# Patient Record
Sex: MALE | Race: WHITE | NOT HISPANIC OR LATINO | Employment: UNEMPLOYED | URBAN - METROPOLITAN AREA
[De-identification: names, ages, dates, MRNs, and addresses within clinical notes are randomized per-mention and may not be internally consistent; named-entity substitution may affect disease eponyms.]

---

## 2017-01-23 RX ORDER — TEMAZEPAM 15 MG/1
CAPSULE ORAL
Qty: 30 CAP | Refills: 0 | Status: SHIPPED
Start: 2017-01-23 | End: 2017-02-19 | Stop reason: SDUPTHER

## 2017-01-23 NOTE — TELEPHONE ENCOUNTER
scanned into media.    Was the patient seen in the last year in this department? Yes     Does patient have an active prescription for medications requested? No     Received Request Via: Pharmacy

## 2017-02-22 RX ORDER — TEMAZEPAM 15 MG/1
CAPSULE ORAL
Qty: 90 CAP | Refills: 1 | Status: SHIPPED | OUTPATIENT
Start: 2017-02-22

## 2017-03-02 ENCOUNTER — HOSPITAL ENCOUNTER (OUTPATIENT)
Facility: MEDICAL CENTER | Age: 52
End: 2017-03-02
Attending: ORTHOPAEDIC SURGERY | Admitting: ORTHOPAEDIC SURGERY
Payer: MEDICAID

## 2017-03-03 ENCOUNTER — HOSPITAL ENCOUNTER (OUTPATIENT)
Dept: RADIOLOGY | Facility: MEDICAL CENTER | Age: 52
End: 2017-03-03
Attending: ORTHOPAEDIC SURGERY | Admitting: ORTHOPAEDIC SURGERY
Payer: MEDICAID

## 2017-03-03 VITALS — BODY MASS INDEX: 29.66 KG/M2 | HEIGHT: 71 IN | WEIGHT: 211.86 LBS

## 2017-03-03 DIAGNOSIS — Z01.810 PRE-OPERATIVE CARDIOVASCULAR EXAMINATION: ICD-10-CM

## 2017-03-03 DIAGNOSIS — Z01.811 PRE-OPERATIVE RESPIRATORY EXAMINATION: ICD-10-CM

## 2017-03-03 DIAGNOSIS — Z01.812 PRE-PROCEDURAL LABORATORY EXAMINATION: ICD-10-CM

## 2017-03-03 LAB
ALBUMIN SERPL BCP-MCNC: 4.3 G/DL (ref 3.2–4.9)
ALBUMIN/GLOB SERPL: 1.3 G/DL
ALP SERPL-CCNC: 89 U/L (ref 30–99)
ALT SERPL-CCNC: 46 U/L (ref 2–50)
ANION GAP SERPL CALC-SCNC: 13 MMOL/L (ref 0–11.9)
APPEARANCE UR: ABNORMAL
APTT PPP: 26.8 SEC (ref 24.7–36)
AST SERPL-CCNC: 28 U/L (ref 12–45)
BASOPHILS # BLD AUTO: 0.6 % (ref 0–1.8)
BASOPHILS # BLD: 0.07 K/UL (ref 0–0.12)
BILIRUB SERPL-MCNC: 0.4 MG/DL (ref 0.1–1.5)
BILIRUB UR QL STRIP.AUTO: NEGATIVE
BUN SERPL-MCNC: 19 MG/DL (ref 8–22)
CALCIUM SERPL-MCNC: 9.6 MG/DL (ref 8.5–10.5)
CHLORIDE SERPL-SCNC: 102 MMOL/L (ref 96–112)
CO2 SERPL-SCNC: 19 MMOL/L (ref 20–33)
COLOR UR: ABNORMAL
CREAT SERPL-MCNC: 0.93 MG/DL (ref 0.5–1.4)
CULTURE IF INDICATED INDCX: YES UA CULTURE
EKG IMPRESSION: NORMAL
EOSINOPHIL # BLD AUTO: 0.18 K/UL (ref 0–0.51)
EOSINOPHIL NFR BLD: 1.4 % (ref 0–6.9)
ERYTHROCYTE [DISTWIDTH] IN BLOOD BY AUTOMATED COUNT: 49.3 FL (ref 35.9–50)
ERYTHROCYTE [SEDIMENTATION RATE] IN BLOOD BY WESTERGREN METHOD: 4 MM/HOUR (ref 0–20)
GFR SERPL CREATININE-BSD FRML MDRD: >60 ML/MIN/1.73 M 2
GLOBULIN SER CALC-MCNC: 3.4 G/DL (ref 1.9–3.5)
GLUCOSE SERPL-MCNC: 87 MG/DL (ref 65–99)
GLUCOSE UR STRIP.AUTO-MCNC: NEGATIVE MG/DL
HCT VFR BLD AUTO: 51.8 % (ref 42–52)
HGB BLD-MCNC: 16.9 G/DL (ref 14–18)
IMM GRANULOCYTES # BLD AUTO: 0.08 K/UL (ref 0–0.11)
IMM GRANULOCYTES NFR BLD AUTO: 0.6 % (ref 0–0.9)
INR PPP: 0.93 (ref 0.87–1.13)
KETONES UR STRIP.AUTO-MCNC: NEGATIVE MG/DL
LEUKOCYTE ESTERASE UR QL STRIP.AUTO: ABNORMAL
LYMPHOCYTES # BLD AUTO: 2.01 K/UL (ref 1–4.8)
LYMPHOCYTES NFR BLD: 16.1 % (ref 22–41)
MCH RBC QN AUTO: 31.9 PG (ref 27–33)
MCHC RBC AUTO-ENTMCNC: 32.6 G/DL (ref 33.7–35.3)
MCV RBC AUTO: 97.9 FL (ref 81.4–97.8)
MICRO URNS: ABNORMAL
MONOCYTES # BLD AUTO: 0.88 K/UL (ref 0–0.85)
MONOCYTES NFR BLD AUTO: 7.1 % (ref 0–13.4)
MUCOUS THREADS #/AREA URNS HPF: ABNORMAL /HPF
NEUTROPHILS # BLD AUTO: 9.25 K/UL (ref 1.82–7.42)
NEUTROPHILS NFR BLD: 74.2 % (ref 44–72)
NITRITE UR QL STRIP.AUTO: NEGATIVE
NRBC # BLD AUTO: 0 K/UL
NRBC BLD AUTO-RTO: 0 /100 WBC
PH UR STRIP.AUTO: 6.5 [PH]
PLATELET # BLD AUTO: 223 K/UL (ref 164–446)
PMV BLD AUTO: 9.5 FL (ref 9–12.9)
POTASSIUM SERPL-SCNC: 4.2 MMOL/L (ref 3.6–5.5)
PROT SERPL-MCNC: 7.7 G/DL (ref 6–8.2)
PROT UR QL STRIP: NEGATIVE MG/DL
PROTHROMBIN TIME: 12.7 SEC (ref 12–14.6)
RBC # BLD AUTO: 5.29 M/UL (ref 4.7–6.1)
RBC # URNS HPF: ABNORMAL /HPF
RBC UR QL AUTO: ABNORMAL
SCCMEC + MECA PNL NOSE NAA+PROBE: NEGATIVE
SCCMEC + MECA PNL NOSE NAA+PROBE: NEGATIVE
SODIUM SERPL-SCNC: 134 MMOL/L (ref 135–145)
SP GR UR STRIP.AUTO: 1.01
WBC # BLD AUTO: 12.5 K/UL (ref 4.8–10.8)
WBC #/AREA URNS HPF: >150 /HPF
YEAST BUDDING URNS QL: PRESENT /HPF

## 2017-03-03 PROCEDURE — 81001 URINALYSIS AUTO W/SCOPE: CPT

## 2017-03-03 PROCEDURE — 85730 THROMBOPLASTIN TIME PARTIAL: CPT

## 2017-03-03 PROCEDURE — 80053 COMPREHEN METABOLIC PANEL: CPT

## 2017-03-03 PROCEDURE — 87086 URINE CULTURE/COLONY COUNT: CPT

## 2017-03-03 PROCEDURE — 87106 FUNGI IDENTIFICATION YEAST: CPT

## 2017-03-03 PROCEDURE — 71020 DX-CHEST-2 VIEWS: CPT

## 2017-03-03 PROCEDURE — 85025 COMPLETE CBC W/AUTO DIFF WBC: CPT

## 2017-03-03 PROCEDURE — 85610 PROTHROMBIN TIME: CPT

## 2017-03-03 PROCEDURE — 87641 MR-STAPH DNA AMP PROBE: CPT

## 2017-03-03 PROCEDURE — 85652 RBC SED RATE AUTOMATED: CPT

## 2017-03-03 PROCEDURE — 36415 COLL VENOUS BLD VENIPUNCTURE: CPT

## 2017-03-03 PROCEDURE — 87640 STAPH A DNA AMP PROBE: CPT | Mod: 59

## 2017-03-03 RX ORDER — TRAMADOL HYDROCHLORIDE 50 MG/1
50 TABLET ORAL EVERY 4 HOURS PRN
COMMUNITY
End: 2017-03-07 | Stop reason: HOSPADM

## 2017-03-03 RX ORDER — ALPRAZOLAM 0.5 MG/1
0.5 TABLET ORAL 3 TIMES DAILY PRN
COMMUNITY
End: 2017-03-07 | Stop reason: HOSPADM

## 2017-03-03 NOTE — DISCHARGE PLANNING
Met with patient during preadmission appointment.  Procedure: Total Hip  Procedure Date: 3/7/17  Insurance:  Medicaid FFS  Equipment currently available at home? 4WW with seat, shower chair  Steps into the home? 1  Steps within the home? 0  Toilet height? Standard, discussed raised toilet seat  Type of shower? Walk-in, discussed shower chair  Who will be with you during your recovery? Father  Is Outpatient Physical Therapy set up after surgery? No  Did you take the Total Joint Class and where? No, did not want information    Plan: There are no identified discharge needs.

## 2017-03-07 RX ORDER — SODIUM CHLORIDE, SODIUM LACTATE, POTASSIUM CHLORIDE, CALCIUM CHLORIDE 600; 310; 30; 20 MG/100ML; MG/100ML; MG/100ML; MG/100ML
1000 INJECTION, SOLUTION INTRAVENOUS
Status: DISCONTINUED | OUTPATIENT
Start: 2017-03-07 | End: 2017-03-07 | Stop reason: HOSPADM

## 2017-03-08 ENCOUNTER — HOSPITAL ENCOUNTER (OUTPATIENT)
Dept: RADIOLOGY | Facility: MEDICAL CENTER | Age: 52
End: 2017-03-08

## 2017-03-11 LAB
BACTERIA UR CULT: ABNORMAL
BACTERIA UR CULT: ABNORMAL
SIGNIFICANT IND 70042: ABNORMAL
SITE SITE: ABNORMAL
SOURCE SOURCE: ABNORMAL

## 2017-07-20 ENCOUNTER — OFFICE VISIT (OUTPATIENT)
Dept: PHYSICAL MEDICINE AND REHAB | Facility: MEDICAL CENTER | Age: 52
End: 2017-07-20
Payer: MEDICAID

## 2017-07-20 VITALS
DIASTOLIC BLOOD PRESSURE: 68 MMHG | BODY MASS INDEX: 30.24 KG/M2 | SYSTOLIC BLOOD PRESSURE: 112 MMHG | OXYGEN SATURATION: 92 % | HEART RATE: 107 BPM | WEIGHT: 216 LBS | HEIGHT: 71 IN | TEMPERATURE: 97.2 F

## 2017-07-20 DIAGNOSIS — Z71.89 PAIN MEDICATION AGREEMENT DISCUSSED: ICD-10-CM

## 2017-07-20 DIAGNOSIS — M79.18 MYOFASCIAL PAIN: ICD-10-CM

## 2017-07-20 DIAGNOSIS — M62.838 MUSCLE SPASM: ICD-10-CM

## 2017-07-20 DIAGNOSIS — Z98.890 H/O FOOT SURGERY: ICD-10-CM

## 2017-07-20 DIAGNOSIS — M25.50 ARTHRALGIA, UNSPECIFIED JOINT: ICD-10-CM

## 2017-07-20 DIAGNOSIS — F41.9 ANXIETY AND DEPRESSION: ICD-10-CM

## 2017-07-20 DIAGNOSIS — M79.2 NERVE PAIN: ICD-10-CM

## 2017-07-20 DIAGNOSIS — M54.2 NECK PAIN: ICD-10-CM

## 2017-07-20 DIAGNOSIS — M54.16 LUMBAR RADICULITIS: ICD-10-CM

## 2017-07-20 DIAGNOSIS — M54.9 SPINAL PAIN: ICD-10-CM

## 2017-07-20 DIAGNOSIS — M79.672 LEFT FOOT PAIN: ICD-10-CM

## 2017-07-20 DIAGNOSIS — M25.551 RIGHT HIP PAIN: ICD-10-CM

## 2017-07-20 DIAGNOSIS — M25.561 RIGHT KNEE PAIN, UNSPECIFIED CHRONICITY: ICD-10-CM

## 2017-07-20 DIAGNOSIS — M54.50 LUMBOSACRAL PAIN: ICD-10-CM

## 2017-07-20 DIAGNOSIS — G56.03 BILATERAL CARPAL TUNNEL SYNDROME: ICD-10-CM

## 2017-07-20 DIAGNOSIS — G47.00 INSOMNIA, UNSPECIFIED TYPE: ICD-10-CM

## 2017-07-20 DIAGNOSIS — F32.A ANXIETY AND DEPRESSION: ICD-10-CM

## 2017-07-20 PROCEDURE — 99204 OFFICE O/P NEW MOD 45 MIN: CPT | Performed by: PHYSICAL MEDICINE & REHABILITATION

## 2017-07-20 RX ORDER — TRAMADOL HYDROCHLORIDE 50 MG/1
50 TABLET ORAL EVERY 6 HOURS PRN
Qty: 60 TAB | Refills: 0 | Status: SHIPPED | OUTPATIENT
Start: 2017-07-20

## 2017-07-20 RX ORDER — QUETIAPINE FUMARATE 50 MG/1
50 TABLET, FILM COATED ORAL
Refills: 5 | COMMUNITY
Start: 2017-07-07

## 2017-07-20 RX ORDER — ALPRAZOLAM 0.25 MG/1
0.25 TABLET ORAL 3 TIMES DAILY PRN
COMMUNITY

## 2017-07-20 RX ORDER — TIZANIDINE 4 MG/1
TABLET ORAL
Qty: 45 TAB | Refills: 2 | Status: SHIPPED | OUTPATIENT
Start: 2017-07-20

## 2017-07-20 RX ORDER — GABAPENTIN 300 MG/1
300 CAPSULE ORAL 3 TIMES DAILY
Qty: 45 CAP | Refills: 2 | Status: SHIPPED | OUTPATIENT
Start: 2017-07-20

## 2017-07-20 RX ORDER — ALBUTEROL SULFATE 2.5 MG/3ML
SOLUTION RESPIRATORY (INHALATION)
Refills: 4 | COMMUNITY
Start: 2017-07-02

## 2017-07-20 RX ORDER — TAMSULOSIN HYDROCHLORIDE 0.4 MG/1
CAPSULE ORAL
Refills: 5 | COMMUNITY
Start: 2017-07-07

## 2017-07-20 RX ORDER — TRAMADOL HYDROCHLORIDE 50 MG/1
TABLET ORAL
Refills: 0 | COMMUNITY
Start: 2017-07-06 | End: 2017-07-20 | Stop reason: SDUPTHER

## 2017-07-20 ASSESSMENT — ENCOUNTER SYMPTOMS
TINGLING: 1
FEVER: 0
BACK PAIN: 1
SPUTUM PRODUCTION: 0
VOMITING: 0
MYALGIAS: 1
DOUBLE VISION: 0
CHILLS: 0
INSOMNIA: 1
DEPRESSION: 1
NAUSEA: 0
HEMOPTYSIS: 0
PHOTOPHOBIA: 0
ORTHOPNEA: 0
NERVOUS/ANXIOUS: 1
PALPITATIONS: 0
SENSORY CHANGE: 1
NECK PAIN: 1

## 2017-07-20 NOTE — PROGRESS NOTES
Subjective:      Brendan Gordon is a 51 y.o. 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     The patient is followed by orthopedics/spine surgeon, reviewed records most recently from 6/2017. Orthopedics is considering right hip surgery.    The patient notes ongoing pain about the right hip, worse with activities, limiting standing and walking tolerance.    The patient notes lumbosacral pain, also notes intermittent lower limb radiating pain with neuropathic component.    The patient notes right knee area pain, orthopedics consulted.    The patient notes left foot area pain, worse with activities. The patient has had prior injury to the left foot, also prior left foot surgery.    The patient notes neck pain, also notes upper limb neuropathic symptoms, without overt radicular component.    The patient has bilateral wrist/hand pain, also paresthesias, including at night.    The patient notes some difficulty with sleep.    The patient notes anxiety/depression, history of substance use, in remission, psychiatry consulted.    The patient has had prior treatment with medications. He has tried exercise. No bowel/bladder dysfunction noted. No overt limb weakness noted. Home exercise program has been limited due to the ongoing pain. The ongoing pain limits his ability to function. He is inquiring about additional treatment options.      MEDICAL RECORDS REVIEW/DATA REVIEW: Reviewed in epic.    Records Reviewed: Reviewed referring provider notes.     I reviewed medications. Tolerating tramadol, with some benefit. Has used gabapentin in the past, tolerated. No aberrant behavior noted. No benefit with prior trial with baclofen.     I reviewed  profile 7/20/2017, noted most recent tramadol prescription from Dr. Marte  "filled on 7/6/2017 for 7 days worth, patient has approximate 5 tablets remaining.    I reviewed diagnostic studies:     I reviewed radiographs. Reviewed MRI right hip 10/2015, aylse Gushcloud. Reviewed report of right hip x-rays 2/2017, per Dr. Marte, showed a moderate right hip arthritis. Reviewed MRI lumbar spine 7/2017, alyse Gushcloud, showed disc bulging, degenerative disc disease, without stenosis.    I reviewed lab studies. Reviewed CMP 10/2013. Review labs 3/2017, including CBC.    I reviewed medical issues.     I reviewed family history: No neuromuscular disorders noted.    I reviewed social issues. Previously professional dirt bike rider      PAST MEDICAL HISTORY:   Past Medical History   Diagnosis Date   • Anxiety      chronic anxiety x 20 years   • Hypertension    • Back pain      LOWER   • Bronchitis    • COPD (chronic obstructive pulmonary disease) (CMS-HCC)    • Hepatitis C    • Coughing blood    • Psychiatric problem      Depression/anxiety.   • Breath shortness      COPD - \"no oxygen yet\".   • Dental disorder      Full dentures.   • Cold      Cold - started last week.  Denies fever.       PAST SURGICAL HISTORY:    Past Surgical History   Procedure Laterality Date   • Foot surgery Left        ALLERGIES:  Nkda    MEDICATIONS:    Outpatient Encounter Prescriptions as of 7/20/2017   Medication Sig Dispense Refill   • alprazolam (XANAX) 0.25 MG Tab Take 0.25 mg by mouth 3 times a day as needed.     • tramadol (ULTRAM) 50 MG Tab Take 1 Tab by mouth every 6 hours as needed. for pain. Two week supply. 60 Tab 0   • gabapentin (NEURONTIN) 300 MG Cap Take 1 Cap by mouth 3 times a day. as needed for nerve pain. 45 Cap 2   • tizanidine (ZANAFLEX) 4 MG Tab Take 1/2  to 1 tablet by mouth three times per day as needed for muscle spasm 45 Tab 2   • Albuterol Sulfate (PROAIR RESPICLICK) 108 (90 BASE) MCG/ACT AEROSOL POWDER, BREATH ACTIVATED Inhale 2 Puffs by mouth every four hours as needed (for shortness of " breath, wheezing.). 1 Each 5   • tiotropium (SPIRIVA HANDIHALER) 18 MCG Cap Use 1 capsule inhaled from handihaler every day. 30 Cap 5   • lisinopril (PRINIVIL) 20 MG TABS Take 20 mg by mouth every day.     • sertraline (ZOLOFT) 50 MG TABS Take 50 mg by mouth every day.     • albuterol (PROVENTIL) 2.5mg/3ml Nebu Soln solution for nebulization INHALE 3 ML BY MOUTH VIA NEBULIZER AS NEEDED EVERY 8 HRS INHALATION  4   • quetiapine (SEROQUEL) 50 MG tablet Take 50 mg by mouth.  5   • tamsulosin (FLOMAX) 0.4 MG capsule TAKE 1 CAPSULE BY MOUTH 30 MINUTES AFTER THE SAME MEAL EACH DAY ONCE A DAY  5   • [DISCONTINUED] tramadol (ULTRAM) 50 MG Tab TAKE 1 TO 2 TABS BY MOUTH EVERY 4 HRS AS NEEDED FOR PAIN  0   • temazepam (RESTORIL) 15 MG Cap TAKE ONE CAPSULE BY MOUTH AT BEDTIME 90 Cap 1   • Tiotropium Bromide Monohydrate (SPIRIVA RESPIMAT) 2.5 MCG/ACT Aero Soln Inhale 2 Puffs by mouth every day. 1 Inhaler 5     No facility-administered encounter medications on file as of 7/20/2017.       SOCIAL HISTORY:    Social History     Social History   • Marital Status: Single     Spouse Name: N/A   • Number of Children: N/A   • Years of Education: N/A     Social History Main Topics   • Smoking status: Current Every Day Smoker -- 1.00 packs/day for 37 years     Types: Cigarettes     Start date: 01/01/1985   • Smokeless tobacco: Former User     Types: Chew     Quit date: 01/01/2016      Comment: 1 ppd, 1 CAN DAILY   • Alcohol Use: 0.0 oz/week     0 Standard drinks or equivalent per week      Comment: The patient notes occasional alcohol use, agrees to stop use   • Drug Use: No      Comment: history ofiv drug use, heroin, cocaine stopped 11/24/09   • Sexual Activity: Yes     Other Topics Concern   • None     Social History Narrative         Review of Systems   Constitutional: Negative for fever and chills.   HENT: Negative for hearing loss and tinnitus.    Eyes: Negative for double vision and photophobia.   Respiratory: Negative for hemoptysis  "and sputum production.    Cardiovascular: Negative for palpitations and orthopnea.   Gastrointestinal: Negative for nausea and vomiting.   Genitourinary: Negative for urgency and frequency.   Musculoskeletal: Positive for myalgias, back pain, joint pain and neck pain.   Skin: Negative.    Neurological: Positive for tingling and sensory change.   Endo/Heme/Allergies: Negative.    Psychiatric/Behavioral: Positive for depression. The patient is nervous/anxious and has insomnia.    All other systems reviewed and are negative.        Objective:     /68 mmHg  Pulse 107  Temp(Src) 36.2 °C (97.2 °F)  Ht 1.803 m (5' 11\")  Wt 97.977 kg (216 lb)  BMI 30.14 kg/m2  SpO2 92%     Physical Exam  Constitutional: oriented to person, place, and time, appears well-developed and well-nourished.   HEENT: Normocephalic atraumatic, neck supple, no JVD noted, no masses noted, no meningeal signs noted  Lymphadenopathy: no cervical, supraclavicular, or inguinal lymphadenopathy noted  Cardiovascular: Intact distal pulses, including at wrists and ankles, no limb swelling noted  Pulmonary: No tachypnea noted, no accessory muscle use noted, no dyspnea noted  Abdominal: Soft, nontender, exhibits no distension, no peritoneal signs, no HSM  Musculoskeletal:   Right shoulder: exhibits mild tenderness. Minimal pain with range of motion testing  Left shoulder: exhibits mild tenderness. Minimal pain with range of motion testing  Right hip: exhibits  tenderness.  pain with range of motion testing  Left hip: exhibits mild tenderness. Minimal pain with range of motion testing  Cervical back: exhibits mild decreased range of motion, mild tenderness and mild pain. Spurling's testing produces mild axial pain, trigger points noted  Lumbar back: exhibits mild decreased range of motion, mild tenderness and mild pain. negative straight leg testing, trigger points noted  Wrist/hand: mild pain with range of motion testing, equivocal tinel's at wrist, " negative tinel's at elbows  Knee: Tender with palpation about right knee, pain with range of motion testing, crepitus noted, no significant effusion noted, no signs of infection  /Ankle: Left foot deformity noted consistent with prior trauma, pain with range of motion testing, no signs of infection  Neurological: oriented to person, place, and time. Cranial nerves grossly intact, normal strength. Sensation intact distally. Reflexes 1+ in upper and lower limbs, Gait antalgic, reciprocal, No upper motor neuron signs evident  Skin: Skin is intact. no rashes or lesions noted  Psychiatric: normal mood and affect. speech is normal and behavior is normal. Judgment and thought content normal. Cognition and memory are normal.        Assessment/Plan:       ASSESSMENT:    1. History of prior trauma, chronic spinal/joints/musculoskeletal pain, arthritis    2. Right hip pain, sprain strain, arthritis, impingement, orthopedics consulted, considering surgery    3. Lumbosacral pain, myofascial pain, intermittent lumbar radiculitis, degenerative disc disease, spine surgeon consulted    4. Right knee pain, sprain strain    - I would be just reviewing most recent right knee imaging results    5. Neck pain, myofascial pain, intermittent upper limb neuropathic symptoms, consider radiculitis, suspect degenerative disc disease    6. Bilateral wrist/hand pain, sprain strain, carpal tunnel symptoms    - Wrist splints as trial  - Review ergonomic modifications    7. Left foot pain, sprain strain, history of prior injury, prior left foot surgery    - DX-FOOT-COMPLETE 3+ LEFT; Future  - Review orthotics/footwear    8. Anxiety/depression, history of substance use, in remission, psychiatry consulted    - Reviewed psychosocial interventions    9. Insomnia    - Review sleep hygiene    10. Controlled substance agreement discussed    - Check results on urine drug screen, provided today    11. Comorbid medical issues, including pulmonary, hepatitis C  virus, with care per primary care provider, consultant      DISCUSSION/PLAN:    - I discussed management options. I reviewed symptomatic care    - I would like to obtain/review additional records    - I reviewed therapy. I reviewed home exercise program, with medical precautions, with orthopedic activity/restrictions    - The patient can consider complementary trials with acupuncture, superficial massage therapy, or TENS unit    - I reviewed medication monitoring. I reviewed pain medication dosing, reviewed risks and alternatives.    - I recommend taper off of xanax/benzodiazepines due to risk of interaction with pain medication, prescribed by psychiatry, patient agrees, will follow up with psychiatry regarding this.    - I reviewed medication adjustments, including pain medication taper.     - Pending clean drug testing, I wrote prescription for the following under the compassionate use provision:    - gabapentin (NEURONTIN) 300 MG Cap; Take 1 Cap by mouth 3 times a day. as needed for nerve pain.  Dispense: 45 Cap; Refill: 2, which the patient has use in the past, tolerated  - tizanidine (ZANAFLEX) 4 MG Tab; Take 1/2  to 1 tablet by mouth three times per day as needed for muscle spasm  Dispense: 45 Tab; Refill: 2, as a trial  - tramadol (ULTRAM) 50 MG Tab; Take 1 Tab by mouth every 6 hours as needed. for pain. Two week supply.  Dispense: 60 Tab; Refill: 0, this is a decrease in dosing    - Note, if the patient has orthopedic surgery, I would anticipate that the surgeon would provide postoperative pain management    - I reviewed risks, side effects, and interactions of medications, including over-the-counter medications. I reviewed serotonin syndrome risk I reviewed bowel management program.  I advise the patient to avoid sudafed/pseudoephedrine-type medication as well as herbs/supplements. I advise the patient to avoid alcohol use. I reviewed the controlled substance prescribing program. I reviewed further  symptomatic medications.    - I reviewed additional diagnostic options, including further/advanced imaging, electrodiagnostic testing, vascular studies, and further lab screen    - I reviewed additional therapeutic options, including injection/interventional therapy and additional consultative input    - I encourage the patient to stop or decrease cigarette use    - Return in 2 weeks  or an as-needed basis      Please note that this dictation was created using voice recognition software. I have made every reasonable attempt to correct obvious errors but there may be errors of grammar and content that I may have overlooked prior to finalization of this note.

## 2017-07-20 NOTE — MR AVS SNAPSHOT
"        Brendan Gordon   2017 10:15 AM   Office Visit   MRN: [id number]    Department:  [facility]iatry [facility]   Dept Phone:  [phone]    Description:  Male : 1965   Provider:  Lew Rubio M.D.           Reason for Visit     New Patient           Allergies as of 2017     Allergen Noted Reactions    Nkda [No Known Drug Allergy] 2007         You were diagnosed with     Spinal pain   [309526]       Arthralgia, unspecified joint   [4405946]       Pain medication agreement discussed   [336596]       Nerve pain   [976638]       Muscle spasm   [370162]       Left foot pain   [306412]       H/O foot surgery   [667299]       Neck pain   [720495]       Bilateral carpal tunnel syndrome   [992365]       Lumbosacral pain   [864034]       Lumbar radiculitis   [855334]       Right hip pain   [660234]       Right knee pain, unspecified chronicity   [0840230]       Anxiety and depression   [966935]       Insomnia, unspecified type   [2928544]       Myofascial pain   [551957]         Vital Signs     Blood Pressure Pulse Temperature Height Weight Body Mass Index    112/68 mmHg 107 36.2 °C (97.2 °F) 1.803 m (5' 11\") 97.977 kg (216 lb) 30.14 kg/m2    Oxygen Saturation Smoking Status                92% Current Every Day Smoker          Basic Information     Date Of Birth Sex Race Ethnicity Preferred Language    1965 Male White Non- English      Your appointments     Aug 03, 2017  8:00 AM   Follow Up Visit with Lew Rubio M.D.   [facility] (--)    [address] [facility] [address]  [facility] [id number] [phone]           You will be receiving a confirmation call a few days before your appointment from our automated call confirmation system.              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   10/2/2013 - Present    Vision loss, right eye H54.61   10/14/2013 - Present    Endophthalmitis, right eye H44.001  "  10/14/2013 - Present    COPD (chronic obstructive pulmonary disease) (CMS-[facility]) J44.9   5/16/2016 - Present      Health Maintenance        Date Due Completion Dates    IMM DTaP/Tdap/Td Vaccine (1 - Tdap) 10/21/1982 ---    COLONOSCOPY 10/21/2013 ---    IMM INFLUENZA (1) 9/1/2017 11/3/2015            Current Immunizations     13-VALENT PCV PREVNAR 11/4/2015    Influenza TIV (IM) 11/3/2015    Pneumococcal polysaccharide vaccine (PPSV-23) 11/3/2014      Below and/or attached are the medications your provider expects you to take. Review all of your home medications and newly ordered medications with your provider and/or pharmacist. Follow medication instructions as directed by your provider and/or pharmacist. Please keep your medication list with you and share with your provider. Update the information when medications are discontinued, doses are changed, or new medications (including over-the-counter products) are added; and carry medication information at all times in the event of emergency situations     Allergies:  NKDA - (reactions not documented)               Medications  Valid as of: July 20, 2017 - 11:12 AM    Generic Name Brand Name Tablet Size Instructions for use    Albuterol Sulfate (AEROSOL POWDER, BREATH ACTIVATED) Albuterol Sulfate 108 (90 BASE) MCG/ACT Inhale 2 Puffs by mouth every four hours as needed (for shortness of breath, wheezing.).        Albuterol Sulfate (Nebu Soln) PROVENTIL 2.5mg/3ml INHALE 3 ML BY MOUTH VIA NEBULIZER AS NEEDED EVERY 8 HRS INHALATION        ALPRAZolam (Tab) XANAX 0.25 MG Take 0.25 mg by mouth 3 times a day as needed.        Gabapentin (Cap) NEURONTIN 300 MG Take 1 Cap by mouth 3 times a day. as needed for nerve pain.        Lisinopril (Tab) PRINIVIL 20 MG Take 20 mg by mouth every day.        QUEtiapine Fumarate (Tab) SEROQUEL 50 MG Take 50 mg by mouth.        Sertraline HCl (Tab) ZOLOFT 50 MG Take 50 mg by mouth every day.        Tamsulosin HCl (Cap) FLOMAX 0.4 MG TAKE 1  CAPSULE BY MOUTH 30 MINUTES AFTER THE SAME MEAL EACH DAY ONCE A DAY        Temazepam (Cap) RESTORIL 15 MG TAKE ONE CAPSULE BY MOUTH AT BEDTIME        Tiotropium Bromide Monohydrate (Aero Soln) Tiotropium Bromide Monohydrate 2.5 MCG/ACT Inhale 2 Puffs by mouth every day.        Tiotropium Bromide Monohydrate (Cap) SPIRIVA 18 MCG Use 1 capsule inhaled from handihaler every day.        TiZANidine HCl (Tab) ZANAFLEX 4 MG Take 1/2  to 1 tablet by mouth three times per day as needed for muscle spasm        TraMADol HCl (Tab) ULTRAM 50 MG Take 1 Tab by mouth every 6 hours as needed. for pain. Two week supply.        .                 Medicines prescribed today were sent to:     [facility] #[id number] - [place] - [address] [place] [address]    Phone: [phone] Fax: [phone]    Open 24 Hours?: No      Medication refill instructions:       If your prescription bottle indicates you have medication refills left, it is not necessary to call your provider’s office. Please contact your pharmacy and they will refill your medication.    If your prescription bottle indicates you do not have any refills left, you may request refills at any time through one of the following ways: The online [employer] system (except Urgent Care), by calling your provider’s office, or by asking your pharmacy to contact your provider’s office with a refill request. Medication refills are processed only during regular business hours and may not be available until the next business day. Your provider may request additional information or to have a follow-up visit with you prior to refilling your medication.   *Please Note: Medication refills are assigned a new Rx number when refilled electronically. Your pharmacy may indicate that no refills were authorized even though a new prescription for the same medication is available at the pharmacy. Please request the medicine by name with the pharmacy before contacting your provider for a refill.         Your To Do List     Future Labs/Procedures Complete By Expires    DX-CERVICAL SPINE-4+ VIEWS  As directed 7/20/2018    DX-FOOT-COMPLETE 3+ LEFT  As directed 7/20/2018         SocialSci Access Code: [email or web address]  Expires: 8/19/2017 11:12 AM    [employer]  A secure, online tool to manage your health information     [employer]’s SocialSci® is a secure, online tool that connects you to your personalized health information from the privacy of your home -- day or night - making it very easy for you to manage your healthcare. Once the activation process is completed, you can even access your medical information using the SocialSci elizabeth, which is available for free in the Apple Elizabeth store or Google Play store.     SocialSci provides the following levels of access (as shown below):   My Chart Features   [facility] Doctor [facility]  Urgent  Care Non-[facility]  Primary Care  Doctor   Email your healthcare team securely and privately 24/7 X X X    Manage appointments: schedule your next appointment; view details of past/upcoming appointments X      Request prescription refills. X      View recent personal medical records, including lab and immunizations X X X X   View health record, including health history, allergies, medications X X X X   Read reports about your outpatient visits, procedures, consult and ER notes X X X X   See your discharge summary, which is a recap of your hospital and/or ER visit that includes your diagnosis, lab results, and care plan. X X       How to register for SocialSci:  1. Go to  [email or web address].  2. Click on the Sign Up Now box, which takes you to the New Member Sign Up page. You will need to provide the following information:  a. Enter your SocialSci Access Code exactly as it appears at the top of this page. (You will not need to use this code after you’ve completed the sign-up process. If you do not sign up before the expiration date, you must request a new code.)    b. Enter your date of birth.   c. Enter your home email address.   d. Click Submit, and follow the next screen’s instructions.  3. Create a [employer] ID. This will be your [employer] login ID and cannot be changed, so think of one that is secure and easy to remember.  4. Create a [employer] password. You can change your password at any time.  5. Enter your Password Reset Question and Answer. This can be used at a later time if you forget your password.   6. Enter your e-mail address. This allows you to receive e-mail notifications when new information is available in [employer].  7. Click Sign Up. You can now view your health information.    For assistance activating your [employer] account, call [phone]        Quit Tobacco Information     Do you want to quit using tobacco?    Quitting tobacco decreases risks of cancer, heart and lung disease, increases life expectancy, improves sense of taste and smell, and increases spending money, among other benefits.    If you are thinking about quitting, we can help.  • [facility] Quit Tobacco Program: 151.117.7813  o Program occurs weekly for four weeks and includes pharmacist consultation on products to support quitting smoking or chewing tobacco. A provider referral is needed for pharmacist consultation.  • Tobacco Users Help Hotline: 2-402-QUITNOW (940-9658) or https://[place].quitlogix.org/  o Free, confidential telephone and online coaching for [place] residents. Sessions are designed on a schedule that is convenient for you. Eligible clients receive free nicotine replacement therapy.  • Nationally: www.smokefree.gov  o Information and professional assistance to support both immediate and long-term needs as you become, and remain, a non-smoker. Smokefree.gov allows you to choose the help that best fits your needs.

## 2017-08-01 ENCOUNTER — HOSPITAL ENCOUNTER (OUTPATIENT)
Dept: RADIOLOGY | Facility: MEDICAL CENTER | Age: 52
End: 2017-08-01
Attending: PHYSICAL MEDICINE & REHABILITATION
Payer: MEDICAID

## 2017-08-01 DIAGNOSIS — M79.672 LEFT FOOT PAIN: ICD-10-CM

## 2017-08-01 DIAGNOSIS — Z98.890 H/O FOOT SURGERY: ICD-10-CM

## 2017-08-01 DIAGNOSIS — M54.2 NECK PAIN: ICD-10-CM

## 2017-08-01 PROCEDURE — 72050 X-RAY EXAM NECK SPINE 4/5VWS: CPT

## 2017-08-01 PROCEDURE — 73630 X-RAY EXAM OF FOOT: CPT | Mod: LT

## 2017-08-04 ENCOUNTER — HOSPITAL ENCOUNTER (OUTPATIENT)
Dept: RADIOLOGY | Facility: MEDICAL CENTER | Age: 52
End: 2017-08-04

## 2017-08-14 ENCOUNTER — HOSPITAL ENCOUNTER (OUTPATIENT)
Dept: RADIOLOGY | Facility: MEDICAL CENTER | Age: 52
End: 2017-08-14

## 2017-08-28 ENCOUNTER — TELEPHONE (OUTPATIENT)
Dept: MEDICAL GROUP | Facility: PHYSICIAN GROUP | Age: 52
End: 2017-08-28

## 2017-08-28 NOTE — TELEPHONE ENCOUNTER
Pharmacy called stating pt is getting Alprazolam x3 a day from another doctor and getting temazepam from you, pharmacy would like to know if you still want him to take temazepam? While he is on alprazolam

## 2017-08-28 NOTE — TELEPHONE ENCOUNTER
Please inform patient not to take both lorazepam and temazepam symptoms together, stay with lorazepam as needed or vice versa.

## 2017-09-13 ENCOUNTER — APPOINTMENT (OUTPATIENT)
Dept: PHYSICAL MEDICINE AND REHAB | Facility: MEDICAL CENTER | Age: 52
End: 2017-09-13
Payer: MEDICAID

## 2021-03-15 DIAGNOSIS — Z23 NEED FOR VACCINATION: ICD-10-CM
